# Patient Record
Sex: FEMALE | Race: WHITE | Employment: UNEMPLOYED | URBAN - METROPOLITAN AREA
[De-identification: names, ages, dates, MRNs, and addresses within clinical notes are randomized per-mention and may not be internally consistent; named-entity substitution may affect disease eponyms.]

---

## 2017-01-06 ENCOUNTER — APPOINTMENT (OUTPATIENT)
Dept: LAB | Age: 62
End: 2017-01-06
Attending: INTERNAL MEDICINE
Payer: COMMERCIAL

## 2017-01-06 DIAGNOSIS — Z86.19 HISTORY OF HELICOBACTER PYLORI INFECTION: ICD-10-CM

## 2017-01-06 PROCEDURE — 36415 COLL VENOUS BLD VENIPUNCTURE: CPT | Performed by: INTERNAL MEDICINE

## 2017-01-06 PROCEDURE — 87338 HPYLORI STOOL AG IA: CPT

## 2017-01-06 PROCEDURE — 80076 HEPATIC FUNCTION PANEL: CPT | Performed by: INTERNAL MEDICINE

## 2017-01-07 LAB — HELICOBACTER PYLORI AG, FECAL: POSITIVE

## 2017-01-16 ENCOUNTER — TELEPHONE (OUTPATIENT)
Dept: GASTROENTEROLOGY | Facility: CLINIC | Age: 62
End: 2017-01-16

## 2017-01-31 ENCOUNTER — OFFICE VISIT (OUTPATIENT)
Dept: GASTROENTEROLOGY | Facility: CLINIC | Age: 62
End: 2017-01-31

## 2017-01-31 VITALS
HEART RATE: 59 BPM | BODY MASS INDEX: 22.4 KG/M2 | SYSTOLIC BLOOD PRESSURE: 116 MMHG | WEIGHT: 139.38 LBS | DIASTOLIC BLOOD PRESSURE: 74 MMHG | HEIGHT: 66 IN

## 2017-01-31 DIAGNOSIS — Z12.12 ENCOUNTER FOR COLORECTAL CANCER SCREENING: ICD-10-CM

## 2017-01-31 DIAGNOSIS — A04.8 H. PYLORI INFECTION: Primary | ICD-10-CM

## 2017-01-31 DIAGNOSIS — Z12.11 ENCOUNTER FOR COLORECTAL CANCER SCREENING: ICD-10-CM

## 2017-01-31 PROCEDURE — 99212 OFFICE O/P EST SF 10 MIN: CPT | Performed by: INTERNAL MEDICINE

## 2017-01-31 PROCEDURE — 99214 OFFICE O/P EST MOD 30 MIN: CPT | Performed by: INTERNAL MEDICINE

## 2017-01-31 RX ORDER — OMEPRAZOLE 20 MG/1
20 CAPSULE, DELAYED RELEASE ORAL
Qty: 28 CAPSULE | Refills: 0 | Status: SHIPPED | OUTPATIENT
Start: 2017-01-31 | End: 2017-03-02

## 2017-01-31 RX ORDER — DOXYCYCLINE HYCLATE 100 MG/1
100 TABLET, DELAYED RELEASE ORAL 2 TIMES DAILY
Qty: 28 TABLET | Refills: 0 | Status: SHIPPED | OUTPATIENT
Start: 2017-01-31

## 2017-01-31 RX ORDER — METRONIDAZOLE 500 MG/1
500 TABLET ORAL 2 TIMES DAILY
Qty: 28 TABLET | Refills: 0 | Status: SHIPPED | OUTPATIENT
Start: 2017-01-31

## 2017-01-31 NOTE — H&P
History of present illness: This is a 62-year-old female patient of Dr. Bobby Gao well known to me. She has a history of H. pylori infection and unsuccessful treatment with amoxicillin, clarithromycin and omeprazole several months ago.   She had repeat H.

## 2017-01-31 NOTE — PROGRESS NOTES
HPI:    Patient ID: Carver Fleischer is a 62year old female. HPI    Review of Systems   Constitutional: Negative for activity change and appetite change. HENT: Negative for ear pain, sore throat and trouble swallowing. Eyes: Negative for pain.    R daily. Disp:  Rfl:    Ibuprofen (ADVIL) 200 MG Oral Cap Take  by mouth.  Disp:  Rfl:    VITAMIN C CR 1000 MG OR TBCR 1 TABLET DAILY Disp:  Rfl:    Pantoprazole Sodium 40 MG Oral Tab EC Take 1 tablet (40 mg total) by mouth every morning before breakfast. In Her behavior is normal. Judgment and thought content normal.   Nursing note and vitals reviewed.              ASSESSMENT/PLAN:   H. pylori infection  (primary encounter diagnosis)  Encounter for colorectal cancer screening    No orders of the defined types

## 2017-01-31 NOTE — PATIENT INSTRUCTIONS
1.  Triple therapy for H. pylori infection has been sent to your pharmacy (doxycycline, Pepto-Bismol, metronidazole, omeprazole). 2.  You are due for colonoscopy screening. We will schedule with split dose MiraLAX preparation and conscious sedation.

## 2017-02-01 ENCOUNTER — TELEPHONE (OUTPATIENT)
Dept: GASTROENTEROLOGY | Facility: CLINIC | Age: 62
End: 2017-02-01

## 2017-02-01 NOTE — TELEPHONE ENCOUNTER
Scheduled for:  COLONOSCOPY  Date:    Location:    Sedation:  IV SEDATION  Time:    Prep:MIRALAX  Meds/Allergies Reconciled?:    Diagnosis with codes:  H-PYLORI INFECTION A04.8,COLON CANCER SCREENING Z12.11  Was patient informed to call insurance with code